# Patient Record
(demographics unavailable — no encounter records)

---

## 2024-10-17 NOTE — CONSULT LETTER
[Dear  ___] : Dear  [unfilled], [Consult Letter:] : I had the pleasure of evaluating your patient, [unfilled]. [Please see my note below.] : Please see my note below. [Consult Closing:] : Thank you very much for allowing me to participate in the care of this patient.  If you have any questions, please do not hesitate to contact me. [Sincerely,] : Sincerely, [FreeTextEntry3] : Dionne Rivas MD Pediatric Nephrologist [facility] [phone]

## 2024-10-17 NOTE — CONSULT LETTER
[Dear  ___] : Dear  [unfilled], [Consult Letter:] : I had the pleasure of evaluating your patient, [unfilled]. [Please see my note below.] : Please see my note below. [Consult Closing:] : Thank you very much for allowing me to participate in the care of this patient.  If you have any questions, please do not hesitate to contact me. [Sincerely,] : Sincerely, [FreeTextEntry3] : Dionne Rivas MD Pediatric Nephrologist [facility] [phone]

## 2024-10-17 NOTE — ADDENDUM
[FreeTextEntry1] : Cr increased to 1.33, cystatin C 0.85, eGFR 79 ml/min/1.73m2 (CKid U25 combined), which is again mildly decreased. PTH, electrolytes, UA normal. Increase hydration. Proceed with US doppler kidneys. Follow up in 2-3 months